# Patient Record
Sex: FEMALE | Race: WHITE | Employment: FULL TIME | URBAN - METROPOLITAN AREA
[De-identification: names, ages, dates, MRNs, and addresses within clinical notes are randomized per-mention and may not be internally consistent; named-entity substitution may affect disease eponyms.]

---

## 2019-04-24 PROBLEM — Z34.90 PREGNANCY: Status: ACTIVE | Noted: 2019-04-24

## 2020-07-21 ENCOUNTER — VIRTUAL VISIT (OUTPATIENT)
Dept: FAMILY MEDICINE CLINIC | Age: 35
End: 2020-07-21

## 2020-07-21 DIAGNOSIS — L65.9 ALOPECIA: ICD-10-CM

## 2020-07-21 DIAGNOSIS — F41.9 ANXIETY: Primary | ICD-10-CM

## 2020-07-21 RX ORDER — SPIRONOLACTONE 100 MG/1
100 TABLET, FILM COATED ORAL DAILY
Qty: 30 TAB | Refills: 0
Start: 2020-07-21 | End: 2020-09-22 | Stop reason: SDUPTHER

## 2020-07-21 NOTE — PROGRESS NOTES
Ami Jarrell is a 37 y.o. female who was seen by synchronous (real-time) audio-video technology on 7/21/2020 for Anxiety (establish care. no fever or chills. no cough.) and Hair/Scalp Problem        Assessment & Plan:   Diagnoses and all orders for this visit:    1. Anxiety    2. Alopecia  -     spironolactone (ALDACTONE) 100 mg tablet; Take 1 Tab by mouth daily. She will follow up with derm and Gyn    I have discussed the diagnosis with the patient and the intended plan of care as seen in the above orders. The patient has received an after-visit summary and questions were answered concerning future plans. I have discussed medication, side effects, and warnings with the patient in detail. The patient verbalized understanding and is in agreement with the plan of care. The patient will contact the office with any additional concerns. 712  Subjective:       Prior to Admission medications    Medication Sig Start Date End Date Taking? Authorizing Provider   spironolactone (ALDACTONE) 100 mg tablet Take 1 Tab by mouth daily. 7/21/20  Yes Shaun Dancer, MD   ibuprofen (MOTRIN) 600 mg tablet Take 1 Tab by mouth every six (6) hours as needed for Pain. 4/27/19   Kathleen-Ezieme, Doreen Shone, MD   PNV/iron/omega3/folic ac/f.a.1 (PRE-PEPE MULTIVITAMINS/MINERALS PO) Take  by mouth. Other, MD Lynda     Patient Active Problem List   Diagnosis Code    Acne L70.9    Rosacea L71.9    Normal labor and delivery O80    Pregnancy Z34.90     Past Medical History:   Diagnosis Date    Abnormal Papanicolaou smear of cervix     Acne 3/25/2011    History of HPV infection     HPV in female     Psychiatric disorder     DEPRESSION    Rosacea 3/25/2011    Scoliosis        Review of Systems   Constitutional: Negative for chills and fever. Respiratory: Negative for cough and shortness of breath. Cardiovascular: Negative for chest pain and palpitations. Skin: Negative for rash. Neurological: Negative. Psychiatric/Behavioral: Negative. Negative for depression, hallucinations, memory loss, substance abuse and suicidal ideas. The patient is not nervous/anxious and does not have insomnia. Objective:   No flowsheet data found. General: alert, cooperative, no distress   Mental  status: normal mood, behavior, speech, dress, motor activity, and thought processes, able to follow commands   HENT: NCAT   Neck: no visualized mass   Resp: no respiratory distress   Neuro: no gross deficits   Skin: no discoloration or lesions of concern on visible areas   Psychiatric: normal affect, consistent with stated mood, no evidence of hallucinations     Additional exam findings: We discussed the expected course, resolution and complications of the diagnosis(es) in detail. Medication risks, benefits, costs, interactions, and alternatives were discussed as indicated. I advised her to contact the office if her condition worsens, changes or fails to improve as anticipated. She expressed understanding with the diagnosis(es) and plan. Allan Chavis, who was evaluated through a patient-initiated, synchronous (real-time) audio-video encounter, and/or her healthcare decision maker, is aware that it is a billable service, with coverage as determined by her insurance carrier. She provided verbal consent to proceed: Yes, and patient identification was verified. It was conducted pursuant to the emergency declaration under the [address] [place] [address] authority and the [employer] General Act. A caregiver was present when appropriate. Ability to conduct physical exam was limited. I was at home. The patient was at home.       Jose Manuel Panchal MD

## 2020-07-28 ENCOUNTER — VIRTUAL VISIT (OUTPATIENT)
Dept: FAMILY MEDICINE CLINIC | Age: 35
End: 2020-07-28

## 2020-07-28 DIAGNOSIS — F41.9 ANXIETY: Primary | ICD-10-CM

## 2020-07-28 RX ORDER — BUSPIRONE HYDROCHLORIDE 7.5 MG/1
7.5 TABLET ORAL 3 TIMES DAILY
Qty: 60 TAB | Refills: 1 | Status: SHIPPED | OUTPATIENT
Start: 2020-07-28

## 2020-07-28 NOTE — PROGRESS NOTES
Braeden De Jesus is a 37 y.o. female who was seen by synchronous (real-time) audio-video technology on 7/28/2020 for Anxiety (she has no ideas of suicide or homicide.  )        Assessment & Plan:   Diagnoses and all orders for this visit:    1. Anxiety  -     busPIRone (BUSPAR) 7.5 mg tablet; Take 1 Tab by mouth three (3) times daily. 712  Subjective:       Prior to Admission medications    Medication Sig Start Date End Date Taking? Authorizing Provider   spironolactone (ALDACTONE) 100 mg tablet Take 1 Tab by mouth daily. 7/21/20   Chaparro Bedoya MD   ibuprofen (MOTRIN) 600 mg tablet Take 1 Tab by mouth every six (6) hours as needed for Pain. 4/27/19   Linda Meyer MD   PNV/iron/omega3/folic ac/f.a.1 (PRE-PEPE MULTIVITAMINS/MINERALS PO) Take  by mouth. Other, MD Lynda     Patient Active Problem List   Diagnosis Code    Acne L70.9    Rosacea L71.9    Normal labor and delivery O80    Pregnancy Z34.90     Past Medical History:   Diagnosis Date    Abnormal Papanicolaou smear of cervix     Acne 3/25/2011    History of HPV infection     HPV in female     Psychiatric disorder     DEPRESSION    Rosacea 3/25/2011    Scoliosis        Review of Systems   Constitutional: Negative for chills and fever. Respiratory: Negative for cough and shortness of breath. Cardiovascular: Negative for chest pain and palpitations. Musculoskeletal: Negative. Psychiatric/Behavioral: Negative for depression, hallucinations, memory loss, substance abuse and suicidal ideas. The patient is nervous/anxious and has insomnia.         Objective:     Patient-Reported Vitals 7/28/2020   Patient-Reported Weight 130   Patient-Reported Height 5'2   Patient-Reported Pulse 98   Patient-Reported Temperature 98.4   Patient-Reported SpO2 99   Patient-Reported LMP July 11      General: alert, cooperative, no distress   Mental  status: normal mood, behavior, speech, dress, motor activity, and thought processes, able to follow commands   HENT: NCAT   Neck: no visualized mass   Resp: no respiratory distress   Neuro: no gross deficits   Skin: no discoloration or lesions of concern on visible areas   Psychiatric: normal affect, consistent with stated mood, no evidence of hallucinations     Additional exam findings: We discussed the expected course, resolution and complications of the diagnosis(es) in detail. Medication risks, benefits, costs, interactions, and alternatives were discussed as indicated. I advised her to contact the office if her condition worsens, changes or fails to improve as anticipated. She expressed understanding with the diagnosis(es) and plan. Dai Ramos, who was evaluated through a patient-initiated, synchronous (real-time) audio-video encounter, and/or her healthcare decision maker, is aware that it is a billable service, with coverage as determined by her insurance carrier. She provided verbal consent to proceed: Yes, and patient identification was verified. It was conducted pursuant to the emergency declaration under the [address] [place] [address] authority and the [employer] and [employer] General Act. A caregiver was present when appropriate. Ability to conduct physical exam was limited. I was in the office. The patient was at home.       Philippe Calvin MD

## 2020-08-06 ENCOUNTER — VIRTUAL VISIT (OUTPATIENT)
Dept: FAMILY MEDICINE CLINIC | Age: 35
End: 2020-08-06

## 2020-08-06 DIAGNOSIS — F41.9 ANXIETY: Primary | ICD-10-CM

## 2020-08-06 NOTE — PROGRESS NOTES
Isabel Pa is a 37 y.o. female who was seen by synchronous (real-time) audio-video technology on 8/6/2020 for Anxiety (no problems)        Assessment & Plan:   Diagnoses and all orders for this visit:    1. Anxiety      Will continue current meds and follow up in about one month in office. 712  Subjective:       Prior to Admission medications    Medication Sig Start Date End Date Taking? Authorizing Provider   busPIRone (BUSPAR) 7.5 mg tablet Take 1 Tab by mouth three (3) times daily. 7/28/20   Ysabel Waters MD   spironolactone (ALDACTONE) 100 mg tablet Take 1 Tab by mouth daily. 7/21/20   Ysabel Waters MD   ibuprofen (MOTRIN) 600 mg tablet Take 1 Tab by mouth every six (6) hours as needed for Pain. 4/27/19   Payam Meyer MD   PNV/iron/omega3/folic ac/f.a.1 (PRE-PEPE MULTIVITAMINS/MINERALS PO) Take  by mouth. Other, MD Lynda     Patient Active Problem List   Diagnosis Code    Acne L70.9    Rosacea L71.9    Normal labor and delivery O80    Pregnancy Z34.90     Past Medical History:   Diagnosis Date    Abnormal Papanicolaou smear of cervix     Acne 3/25/2011    History of HPV infection     HPV in female     Psychiatric disorder     DEPRESSION    Rosacea 3/25/2011    Scoliosis        Review of Systems   Constitutional: Negative for chills and fever. Respiratory: Negative for cough. Cardiovascular: Negative for chest pain and palpitations. Psychiatric/Behavioral: Negative for depression, hallucinations, substance abuse and suicidal ideas. The patient is not nervous/anxious and does not have insomnia.         Objective:     Patient-Reported Vitals 7/28/2020   Patient-Reported Weight 130   Patient-Reported Height 5'2   Patient-Reported Pulse 98   Patient-Reported Temperature 98.4   Patient-Reported SpO2 99   Patient-Reported LMP July 11      General: alert, cooperative, no distress   Mental  status: normal mood, behavior, speech, dress, motor activity, and thought processes, able to follow commands   HENT: NCAT   Neck: no visualized mass   Resp: no respiratory distress   Neuro: no gross deficits   Skin: no discoloration or lesions of concern on visible areas   Psychiatric: normal affect, consistent with stated mood, no evidence of hallucinations     Additional exam findings: We discussed the expected course, resolution and complications of the diagnosis(es) in detail. Medication risks, benefits, costs, interactions, and alternatives were discussed as indicated. I advised her to contact the office if her condition worsens, changes or fails to improve as anticipated. She expressed understanding with the diagnosis(es) and plan. Radha Hallman, who was evaluated through a patient-initiated, synchronous (real-time) audio-video encounter, and/or her healthcare decision maker, is aware that it is a billable service, with coverage as determined by her insurance carrier. She provided verbal consent to proceed: Yes, and patient identification was verified. It was conducted pursuant to the emergency declaration under the [address] [place] [address] authority and the [employer] and [employer] General Act. A caregiver was present when appropriate. Ability to conduct physical exam was limited. I was at home. The patient was at home.       Jose Cooley MD

## 2020-09-22 ENCOUNTER — TELEPHONE (OUTPATIENT)
Dept: FAMILY MEDICINE CLINIC | Age: 35
End: 2020-09-22

## 2020-09-22 ENCOUNTER — OFFICE VISIT (OUTPATIENT)
Dept: FAMILY MEDICINE CLINIC | Age: 35
End: 2020-09-22
Payer: COMMERCIAL

## 2020-09-22 VITALS
HEART RATE: 71 BPM | OXYGEN SATURATION: 97 % | SYSTOLIC BLOOD PRESSURE: 122 MMHG | DIASTOLIC BLOOD PRESSURE: 60 MMHG | RESPIRATION RATE: 14 BRPM | TEMPERATURE: 98.2 F

## 2020-09-22 DIAGNOSIS — R10.30 LOWER ABDOMINAL PAIN: ICD-10-CM

## 2020-09-22 DIAGNOSIS — L65.9 ALOPECIA: Primary | ICD-10-CM

## 2020-09-22 DIAGNOSIS — E55.9 VITAMIN D DEFICIENCY: ICD-10-CM

## 2020-09-22 PROCEDURE — 99214 OFFICE O/P EST MOD 30 MIN: CPT | Performed by: FAMILY MEDICINE

## 2020-09-22 RX ORDER — VALACYCLOVIR HYDROCHLORIDE 1 G/1
1000 TABLET, FILM COATED ORAL 2 TIMES DAILY
Qty: 20 TAB | Refills: 0 | Status: SHIPPED | OUTPATIENT
Start: 2020-09-22 | End: 2020-10-02

## 2020-09-22 RX ORDER — SPIRONOLACTONE 100 MG/1
100 TABLET, FILM COATED ORAL DAILY
Qty: 30 TAB | Refills: 0 | Status: SHIPPED | OUTPATIENT
Start: 2020-09-22 | End: 2020-10-15 | Stop reason: SDUPTHER

## 2020-09-22 NOTE — TELEPHONE ENCOUNTER
Have you been diagnosed with, tested for, or told that you are suspected of having COVID-19 (coronovirus)? No  Have you had a fever or taken medication to treat a fever in the past 72 hours? No  Have you had a cough, SOB, or flu-like symptoms within the past 3 days? No  Have you had direct contact with someone who tested positive for COVID-19 within the past 14 days? No  Do you have a household member with flu-like symptoms, including fever, cough, or SOB? No  Do you currently have flu-like symptoms including fever, cough, or SOB? No  Are you experiencing new loss of taste or smell?  No

## 2020-09-22 NOTE — PATIENT INSTRUCTIONS
Well Visit, Ages 25 to 48: Care Instructions Your Care Instructions Physical exams can help you stay healthy. Your doctor has checked your overall health and may have suggested ways to take good care of yourself. He or she also may have recommended tests. At home, you can help prevent illness with healthy eating, regular exercise, and other steps. Follow-up care is a key part of your treatment and safety. Be sure to make and go to all appointments, and call your doctor if you are having problems. It's also a good idea to know your test results and keep a list of the medicines you take. How can you care for yourself at home? · Reach and stay at a healthy weight. This will lower your risk for many problems, such as obesity, diabetes, heart disease, and high blood pressure. · Get at least 30 minutes of physical activity on most days of the week. Walking is a good choice. You also may want to do other activities, such as running, swimming, cycling, or playing tennis or team sports. Discuss any changes in your exercise program with your doctor. · Do not smoke or allow others to smoke around you. If you need help quitting, talk to your doctor about stop-smoking programs and medicines. These can increase your chances of quitting for good. · Talk to your doctor about whether you have any risk factors for sexually transmitted infections (STIs). Having one sex partner (who does not have STIs and does not have sex with anyone else) is a good way to avoid these infections. · Use birth control if you do not want to have children at this time. Talk with your doctor about the choices available and what might be best for you. · Protect your skin from too much sun. When you're outdoors from 10 a.m. to 4 p.m., stay in the shade or cover up with clothing and a hat with a wide brim. Wear sunglasses that block UV rays. Even when it's cloudy, put broad-spectrum sunscreen (SPF 30 or higher) on any exposed skin. · See a dentist one or two times a year for checkups and to have your teeth cleaned. · Wear a seat belt in the car. Follow your doctor's advice about when to have certain tests. These tests can spot problems early. For everyone · Cholesterol. Have the fat (cholesterol) in your blood tested after age 21. Your doctor will tell you how often to have this done based on your age, family history, or other things that can increase your risk for heart disease. · Blood pressure. Have your blood pressure checked during a routine doctor visit. Your doctor will tell you how often to check your blood pressure based on your age, your blood pressure results, and other factors. · Vision. Talk with your doctor about how often to have a glaucoma test. 
· Diabetes. Ask your doctor whether you should have tests for diabetes. · Colon cancer. Your risk for colorectal cancer gets higher as you get older. Some experts say that adults should start regular screening at age 48 and stop at age 76. Others say to start before age 48 or continue after age 76. Talk with your doctor about your risk and when to start and stop screening. For women · Breast exam and mammogram. Talk to your doctor about when you should have a clinical breast exam and a mammogram. Medical experts differ on whether and how often women under 50 should have these tests. Your doctor can help you decide what is right for you. · Cervical cancer screening test and pelvic exam. Begin with a Pap test at age 24. The test often is part of a pelvic exam. Starting at age 27, you may choose to have a Pap test, an HPV test, or both tests at the same time (called co-testing). Talk with your doctor about how often to have testing. · Tests for sexually transmitted infections (STIs). Ask whether you should have tests for STIs. You may be at risk if you have sex with more than one person, especially if your partners do not wear condoms. For men · Tests for sexually transmitted infections (STIs). Ask whether you should have tests for STIs. You may be at risk if you have sex with more than one person, especially if you do not wear a condom. · Testicular cancer exam. Ask your doctor whether you should check your testicles regularly. · Prostate exam. Talk to your doctor about whether you should have a blood test (called a PSA test) for prostate cancer. Experts differ on whether and when men should have this test. Some experts suggest it if you are older than 39 and are -American or have a father or brother who got prostate cancer when he was younger than 72. When should you call for help? Watch closely for changes in your health, and be sure to contact your doctor if you have any problems or symptoms that concern you. Where can you learn more? Go to http://lisa-mandie.info/ Enter P072 in the search box to learn more about \"Well Visit, Ages 25 to 48: Care Instructions. \" Current as of: May 27, 2020               Content Version: 12.6 © 2269-2910 [employer]. Care instructions adapted under license by [employer] (which disclaims liability or warranty for this information). If you have questions about a medical condition or this instruction, always ask your healthcare professional. [address] any warranty or liability for your use of this information.

## 2020-09-22 NOTE — PROGRESS NOTES
Pt concerned about hair loss and night sweats. She feels that nobody is listening to her and that she keeps getting sent to various specialists. She wants a doctor who is going to listen and help her. 1. Have you been to the ER, urgent care clinic since your last visit? Hospitalized since your last visit? No    2. Have you seen or consulted any other health care providers outside of the [address] [place] [address] since your last visit? Include any pap smears or colon screening.  No

## 2020-09-22 NOTE — PROGRESS NOTES
Michelle Rodriguez is a 37 y.o. female  presents for CPE. She has intermittent lower abdo/pelvic pain. No dysuria or vaginal discharge. She has seen gyn for pain. No Known Allergies  Outpatient Medications Marked as Taking for the 9/22/20 encounter (Office Visit) with Vernell Gonsalves MD   Medication Sig Dispense Refill    spironolactone (ALDACTONE) 100 mg tablet Take 1 Tab by mouth daily. 30 Tab 0    valACYclovir (VALTREX) 1 gram tablet Take 1 Tab by mouth two (2) times a day for 10 days. 20 Tab 0    busPIRone (BUSPAR) 7.5 mg tablet Take 1 Tab by mouth three (3) times daily. 60 Tab 1    ibuprofen (MOTRIN) 600 mg tablet Take 1 Tab by mouth every six (6) hours as needed for Pain. 30 Tab 1    PNV/iron/omega3/folic ac/f.a.1 (PRE-PEPE MULTIVITAMINS/MINERALS PO) Take  by mouth.        Patient Active Problem List   Diagnosis Code    Acne L70.9    Rosacea L71.9    Normal labor and delivery O80    Pregnancy Z34.90     Past Medical History:   Diagnosis Date    Abnormal Papanicolaou smear of cervix     Acne 3/25/2011    History of HPV infection     HPV in female     Psychiatric disorder     DEPRESSION    Rosacea 3/25/2011    Scoliosis      Social History     Socioeconomic History    Marital status: SINGLE     Spouse name: Not on file    Number of children: 2    Years of education: Not on file    Highest education level: Some college, no degree   Tobacco Use    Smoking status: Never Smoker    Smokeless tobacco: Never Used   Substance and Sexual Activity    Alcohol use: No    Drug use: No    Sexual activity: Yes   Other Topics Concern     Family History   Problem Relation Age of Onset    Heart Disease Sister     Pacemaker Sister     Other Sister     Heart Disease Maternal Grandfather     Heart Attack Maternal Grandfather     No Known Problems Mother     No Known Problems Brother     No Known Problems Maternal Grandmother         Review of Systems   Constitutional: Negative for chills, fever, malaise/fatigue and weight loss. Eyes: Negative for blurred vision. Respiratory: Negative for shortness of breath and wheezing. Cardiovascular: Negative for chest pain. Gastrointestinal: Positive for abdominal pain. Negative for nausea and vomiting. Genitourinary: Negative. Musculoskeletal: Negative for myalgias. Skin: Negative for rash. Neurological: Negative for weakness. Vitals:    09/22/20 1059   BP: 122/60   Pulse: 71   Resp: 14   Temp: 98.2 °F (36.8 °C)   SpO2: 97%   PainSc:   0 - No pain   LMP: 08/20/2020       Physical Exam  Vitals signs and nursing note reviewed. Constitutional:       Appearance: Normal appearance. HENT:      Nose: Nose normal.      Mouth/Throat:      Mouth: Mucous membranes are moist.   Eyes:      Extraocular Movements: Extraocular movements intact. Conjunctiva/sclera: Conjunctivae normal.      Pupils: Pupils are equal, round, and reactive to light. Neck:      Musculoskeletal: Normal range of motion and neck supple. Cardiovascular:      Rate and Rhythm: Normal rate and regular rhythm. Pulses: Normal pulses. Heart sounds: Normal heart sounds. Pulmonary:      Effort: Pulmonary effort is normal.      Breath sounds: Normal breath sounds. Abdominal:      General: Abdomen is flat. Bowel sounds are normal.      Palpations: Abdomen is soft. There is no mass. Tenderness: There is no abdominal tenderness. Musculoskeletal: Normal range of motion. Skin:     General: Skin is warm and dry. Neurological:      General: No focal deficit present. Mental Status: She is alert and oriented to person, place, and time. Psychiatric:         Mood and Affect: Mood and affect normal.         Behavior: Behavior normal.         Thought Content: Thought content normal.         Cognition and Memory: Memory normal.         Judgment: Judgment normal.         Assessment/Plan      ICD-10-CM ICD-9-CM    1.  Alopecia  L65.9 704.00 spironolactone (ALDACTONE) 100 mg tablet      valACYclovir (VALTREX) 1 gram tablet      TSH AND FREE T4      TSH AND FREE T4   2. Vitamin D deficiency  E55.9 268.9 VITAMIN D, 25 HYDROXY      VITAMIN D, 25 HYDROXY   3. Lower abdominal pain  R10.30 789.09 CBC WITH AUTOMATED DIFF      METABOLIC PANEL, COMPREHENSIVE      METABOLIC PANEL, COMPREHENSIVE      CBC WITH AUTOMATED DIFF     I have discussed the diagnosis with the patient and the intended plan of care as seen in the above orders. The patient has received an after-visit summary and questions were answered concerning future plans. I have discussed medication, side effects, and warnings with the patient in detail. The patient verbalized understanding and is in agreement with the plan of care. The patient will contact the office with any additional concerns.       Follow-up and Dispositions    · Return if symptoms worsen or fail to improve.       lab results and schedule of future lab studies reviewed with patient    Maria Guadalupe Jones MD

## 2020-09-23 LAB
25(OH)D3 SERPL-MCNC: 60.6 NG/ML (ref 32–100)
A-G RATIO,AGRAT: 2.5 RATIO (ref 1.1–2.6)
ABSOLUTE LYMPHOCYTE COUNT, 10803: 2.6 K/UL (ref 1–4.8)
ALBUMIN SERPL-MCNC: 4.9 G/DL (ref 3.5–5)
ALP SERPL-CCNC: 45 U/L (ref 25–115)
ALT SERPL-CCNC: 15 U/L (ref 5–40)
ANION GAP SERPL CALC-SCNC: 12 MMOL/L (ref 3–15)
AST SERPL W P-5'-P-CCNC: 18 U/L (ref 10–37)
BASOPHILS # BLD: 0.1 K/UL (ref 0–0.2)
BASOPHILS NFR BLD: 1 % (ref 0–2)
BILIRUB SERPL-MCNC: 0.4 MG/DL (ref 0.2–1.2)
BUN SERPL-MCNC: 14 MG/DL (ref 6–22)
CALCIUM SERPL-MCNC: 9.5 MG/DL (ref 8.4–10.5)
CHLORIDE SERPL-SCNC: 102 MMOL/L (ref 98–110)
CO2 SERPL-SCNC: 28 MMOL/L (ref 20–32)
CREAT SERPL-MCNC: 0.6 MG/DL (ref 0.5–1.2)
EOSINOPHIL # BLD: 0 K/UL (ref 0–0.5)
EOSINOPHIL NFR BLD: 0 % (ref 0–6)
ERYTHROCYTE [DISTWIDTH] IN BLOOD BY AUTOMATED COUNT: 11.5 % (ref 10–15.5)
GFRAA, 66117: >60
GFRNA, 66118: >60
GLOBULIN,GLOB: 2 G/DL (ref 2–4)
GLUCOSE SERPL-MCNC: 99 MG/DL (ref 70–99)
GRANULOCYTES,GRANS: 65 % (ref 40–75)
HCT VFR BLD AUTO: 43 % (ref 35.1–46.5)
HGB BLD-MCNC: 13.7 G/DL (ref 11.7–15.5)
LYMPHOCYTES, LYMLT: 26 % (ref 20–45)
MCH RBC QN AUTO: 31 PG (ref 26–34)
MCHC RBC AUTO-ENTMCNC: 32 G/DL (ref 31–36)
MCV RBC AUTO: 99 FL (ref 81–99)
MONOCYTES # BLD: 0.8 K/UL (ref 0.1–1)
MONOCYTES NFR BLD: 8 % (ref 3–12)
NEUTROPHILS # BLD AUTO: 6.6 K/UL (ref 1.8–7.7)
PLATELET # BLD AUTO: 315 K/UL (ref 140–440)
PMV BLD AUTO: 11.2 FL (ref 9–13)
POTASSIUM SERPL-SCNC: 4.4 MMOL/L (ref 3.5–5.5)
PROT SERPL-MCNC: 6.9 G/DL (ref 6.4–8.3)
RBC # BLD AUTO: 4.36 M/UL (ref 3.8–5.2)
SODIUM SERPL-SCNC: 142 MMOL/L (ref 133–145)
T4 FREE SERPL-MCNC: 1.5 NG/DL (ref 0.9–1.8)
TSH SERPL DL<=0.005 MIU/L-ACNC: 2.04 MCU/ML (ref 0.27–4.2)
WBC # BLD AUTO: 10.1 K/UL (ref 4–11)

## 2020-09-28 ENCOUNTER — TELEPHONE (OUTPATIENT)
Dept: FAMILY MEDICINE CLINIC | Age: 35
End: 2020-09-28

## 2020-09-28 NOTE — TELEPHONE ENCOUNTER
Patient called the office stating she does not think she and Dr. Mikayla Arora are a good fit. She says she has had 3 visits with him 2 virtual and one in office. She has asked if she can switch her care to Dr. Simran Kwan, patient has been made aware Dr. Simran Kwan is not accepting new patients at this time. She is asking when will Dr. Simran Kwan be accepting new patients again she says she would be more comfortable with a female doctor anyway so she could tell them everything. Told I was not sure when Dr. Simran Kwan will be accepting new patients again she has asked if she can be notified. Told patient I can send a message to our  who may have more information on this.

## 2020-10-01 NOTE — TELEPHONE ENCOUNTER
Patient called in reference to her lab results. She states she has a lab work question. Her thyroid levels have increased and wants to know if Dr. Elia Womack will recheck it soon, as she seems to notice changes recently.

## 2020-10-06 ENCOUNTER — PATIENT MESSAGE (OUTPATIENT)
Dept: FAMILY MEDICINE CLINIC | Age: 35
End: 2020-10-06

## 2020-10-15 DIAGNOSIS — L65.9 ALOPECIA: ICD-10-CM

## 2020-10-15 RX ORDER — SPIRONOLACTONE 100 MG/1
TABLET, FILM COATED ORAL
Qty: 30 TAB | Refills: 0 | Status: SHIPPED | OUTPATIENT
Start: 2020-10-15 | End: 2020-12-23

## 2020-10-20 ENCOUNTER — TELEPHONE (OUTPATIENT)
Dept: FAMILY MEDICINE CLINIC | Age: 35
End: 2020-10-20

## 2020-10-20 ENCOUNTER — HOSPITAL ENCOUNTER (OUTPATIENT)
Dept: LAB | Age: 35
Discharge: HOME | End: 2020-10-20
Payer: COMMERCIAL

## 2020-10-20 ENCOUNTER — CLINICAL SUPPORT (OUTPATIENT)
Dept: FAMILY MEDICINE CLINIC | Age: 35
End: 2020-10-20
Payer: COMMERCIAL

## 2020-10-20 DIAGNOSIS — R59.9 SWOLLEN LYMPH NODES: ICD-10-CM

## 2020-10-20 DIAGNOSIS — M25.50 ARTHRALGIA, UNSPECIFIED JOINT: ICD-10-CM

## 2020-10-20 DIAGNOSIS — R53.83 FATIGUE, UNSPECIFIED TYPE: ICD-10-CM

## 2020-10-20 DIAGNOSIS — L65.9 ALOPECIA: ICD-10-CM

## 2020-10-20 DIAGNOSIS — L65.9 ALOPECIA: Primary | ICD-10-CM

## 2020-10-20 LAB
T4 FREE SERPL-MCNC: 1.1 NG/DL (ref 0.7–1.5)
TSH SERPL DL<=0.05 MIU/L-ACNC: 2.35 UIU/ML (ref 0.36–3.74)

## 2020-10-20 PROCEDURE — 36415 COLL VENOUS BLD VENIPUNCTURE: CPT

## 2020-10-20 PROCEDURE — 36415 COLL VENOUS BLD VENIPUNCTURE: CPT | Performed by: FAMILY MEDICINE

## 2020-10-20 PROCEDURE — 84439 ASSAY OF FREE THYROXINE: CPT

## 2020-10-20 PROCEDURE — 86617 LYME DISEASE ANTIBODY: CPT

## 2020-10-20 PROCEDURE — 86225 DNA ANTIBODY NATIVE: CPT

## 2020-10-20 NOTE — PROGRESS NOTES
Elder Desai 1985 came in to have blood drawn. Name/ verified. Venipuncture performed on left arm. Pt tolerated it well.      Leatha Ambrose LPN

## 2020-10-20 NOTE — PROGRESS NOTES
Pt also had c/o neck tenderness and swollen lymphnodes. After consulting dr Leonel Sher, ultrasound ordered for pt. Lyme test also added per dr Leonel Sher.

## 2020-10-22 LAB
CENTROMERE B AB SER-ACNC: <0.2 AI (ref 0–0.9)
CHROMATIN AB SERPL-ACNC: <0.2 AI (ref 0–0.9)
DSDNA AB SER-ACNC: 1 IU/ML (ref 0–9)
ENA JO1 AB SER-ACNC: <0.2 AI (ref 0–0.9)
ENA RNP AB SER-ACNC: 0.8 AI (ref 0–0.9)
ENA SCL70 AB SER-ACNC: 0.3 AI (ref 0–0.9)
ENA SM AB SER-ACNC: <0.2 AI (ref 0–0.9)
ENA SS-A AB SER-ACNC: <0.2 AI (ref 0–0.9)
ENA SS-B AB SER-ACNC: <0.2 AI (ref 0–0.9)
SEE BELOW, 164869: NORMAL

## 2020-10-23 ENCOUNTER — HOSPITAL ENCOUNTER (OUTPATIENT)
Dept: ULTRASOUND IMAGING | Age: 35
Discharge: HOME | End: 2020-10-23
Attending: FAMILY MEDICINE
Payer: COMMERCIAL

## 2020-10-23 ENCOUNTER — TELEPHONE (OUTPATIENT)
Dept: FAMILY MEDICINE CLINIC | Age: 35
End: 2020-10-23

## 2020-10-23 DIAGNOSIS — R59.9 SWOLLEN LYMPH NODES: ICD-10-CM

## 2020-10-23 DIAGNOSIS — E04.9 ENLARGED THYROID: Primary | ICD-10-CM

## 2020-10-23 DIAGNOSIS — R53.83 FATIGUE, UNSPECIFIED TYPE: ICD-10-CM

## 2020-10-23 PROCEDURE — 76536 US EXAM OF HEAD AND NECK: CPT

## 2020-10-23 NOTE — TELEPHONE ENCOUNTER
Additional labs have resulted and patient has seen them through [facility] but would like to know from Dr. Savanna Zhang the results. She is having an ultrasound of her thyroid today at 1:00 so may not be available at that time but is requesting results today. She can  Be reached at [phone].

## 2020-10-26 ENCOUNTER — TELEPHONE (OUTPATIENT)
Dept: FAMILY MEDICINE CLINIC | Age: 35
End: 2020-10-26

## 2020-10-26 NOTE — TELEPHONE ENCOUNTER
Referral, ov notes, insurance, and demos faxed to [facility]. It was faxed Friday, but [facility] did not receive it. Faxed again today. Fax confirmation received.

## 2020-10-26 NOTE — TELEPHONE ENCOUNTER
Ms. Malik Dickinson called concerned about her referral to [facility]. She tried to make an appointment with them, however they stated they never received records from us. Carrie Miller did in fact fax the records, but she printed them and re sent this morning. Transmission \"Ok\" Slip received, called patient and informed her that fax was sent again to [phone]. She is calling them to schedule will call us back if she has any problems.

## 2020-10-29 LAB
B BURGDOR IGG PATRN SER IB-IMP: NEGATIVE
B BURGDOR IGM PATRN SER IB-IMP: NEGATIVE
B BURGDOR18KD IGG SER QL IB: ABNORMAL
B BURGDOR23KD IGG SER QL IB: ABNORMAL
B BURGDOR23KD IGM SER QL IB: PRESENT
B BURGDOR28KD IGG SER QL IB: ABNORMAL
B BURGDOR30KD IGG SER QL IB: ABNORMAL
B BURGDOR39KD IGG SER QL IB: ABNORMAL
B BURGDOR39KD IGM SER QL IB: ABNORMAL
B BURGDOR41KD IGG SER QL IB: ABNORMAL
B BURGDOR41KD IGM SER QL IB: ABNORMAL
B BURGDOR45KD IGG SER QL IB: ABNORMAL
B BURGDOR58KD IGG SER QL IB: ABNORMAL
B BURGDOR66KD IGG SER QL IB: ABNORMAL
B BURGDOR93KD IGG SER QL IB: ABNORMAL

## 2020-11-09 DIAGNOSIS — L65.9 ALOPECIA: Primary | ICD-10-CM

## 2020-11-10 ENCOUNTER — TELEPHONE (OUTPATIENT)
Dept: FAMILY MEDICINE CLINIC | Age: 35
End: 2020-11-10

## 2020-11-10 NOTE — TELEPHONE ENCOUNTER
Called to let pt know that I have faxed over second opinion referral, ov notes, and imagine to Dr. Kehinde Shearer office at     [facility]. [address] [place] [address], [address],[facility] [address] [phone]       Pt's daughter answer the phone saying that Mrs. Gala Salter just went into the store. I asked her to let her mom know to contact the office when she gets the chance. She said that she would. Call was ended.

## 2020-11-10 NOTE — TELEPHONE ENCOUNTER
Ms. Malik Dickinson called early afternoon asking to speak with the nurse. She was inquiring about a status on a new referral to Endocrinology. The referral was placed by Dr. Malik Dickinson yesterday for a second opinion. Ms. Malik Dickinson was concerned as to why she hasn't heard anything yet. I explained to her that a referral takes 7-14 business days to process. 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 She expresses to me that she is very concerned about her health & no one is listening to her or returning her calls. Dr. Malik Dickinson contacted the patient and told her he is putting in a new referral for another endocrinology for a 2nd opinion. She told me she recently called her ob/gyn and they had her in, in two days. They discovered her lymph nodes are swollen & they placed a referral to a surgeon to do a consult. She states that she is frustrated because she always has to call [facility] multiple times because of lack of responses. She expresses she has had to call here for her lab results because no one will call about her results. I apologized to her and informed her that the I fwd the concerns to the nurse and manager.,  I explained the nurse forwards msgs placed by the front end staff to the provider & once he responds the nurse contacts her with the information. She explained that she doesn't understand why it takes so long & why she cannot get calls back in a timely manner. She stated at this point she is considering finding a new pcp and contacting [employer] to complain about not getting calls. She states if something happens to her because of lack in care, somebody will be responsible.  I apologized again & told her I would forward her concerns & ask the nurse to contact her in reference to the new referral.

## 2020-11-11 NOTE — TELEPHONE ENCOUNTER
Pt returned my phone call yesterday. I let her know that I had faxed over her referral and info to [facility]. I also gave her the phone number so that she could follow up with them. She told me that she is now having 'new sxs'. Her eyes are puffy and her face, mainly her left cheek is swollen. She also stated that she has an appt on Monday with a general surgeon at [facility] on Monday for her swollen lymph nodes. Pt said she would be calling [facility] sometime this week. There were no further questions or concerns at this time. Call was ended.

## 2020-12-23 DIAGNOSIS — L65.9 ALOPECIA: ICD-10-CM

## 2020-12-23 RX ORDER — SPIRONOLACTONE 100 MG/1
TABLET, FILM COATED ORAL
Qty: 30 TAB | Refills: 0 | Status: SHIPPED | OUTPATIENT
Start: 2020-12-23 | End: 2021-01-22

## 2021-01-22 DIAGNOSIS — L65.9 ALOPECIA: ICD-10-CM

## 2021-01-22 RX ORDER — SPIRONOLACTONE 100 MG/1
TABLET, FILM COATED ORAL
Qty: 30 TAB | Refills: 0 | Status: SHIPPED | OUTPATIENT
Start: 2021-01-22 | End: 2021-02-24

## 2021-02-24 DIAGNOSIS — L65.9 ALOPECIA: ICD-10-CM

## 2021-02-24 RX ORDER — SPIRONOLACTONE 100 MG/1
TABLET, FILM COATED ORAL
Qty: 30 TAB | Refills: 0 | Status: SHIPPED | OUTPATIENT
Start: 2021-02-24 | End: 2021-03-12 | Stop reason: SDUPTHER

## 2021-03-12 DIAGNOSIS — L65.9 ALOPECIA: ICD-10-CM

## 2021-03-12 RX ORDER — SPIRONOLACTONE 100 MG/1
TABLET, FILM COATED ORAL
Qty: 30 TAB | Refills: 0 | Status: SHIPPED | OUTPATIENT
Start: 2021-03-12

## 2021-03-12 NOTE — TELEPHONE ENCOUNTER
This pharmacy faxed over request for the following prescriptions to be filled:    Medication requested :  Requested Prescriptions     Pending Prescriptions Disp Refills    spironolactone (ALDACTONE) 100 mg tablet 30 Tab 0         PCP: Dr. Grey Small or Print: [facility]  Mail order or Local pharmacy [phone]    Scheduled appointment if not seen by current providers in office: LOV 09/22/20 No upcoming scheduled.

## 2021-10-19 ENCOUNTER — TRANSCRIBE ORDER (OUTPATIENT)
Dept: SCHEDULING | Age: 36
End: 2021-10-19

## 2021-10-19 DIAGNOSIS — E03.9 HYPOTHYROID: Primary | ICD-10-CM

## 2021-10-21 ENCOUNTER — HOSPITAL ENCOUNTER (OUTPATIENT)
Dept: ULTRASOUND IMAGING | Age: 36
Discharge: HOME | End: 2021-10-21
Attending: INTERNAL MEDICINE
Payer: COMMERCIAL

## 2021-10-21 DIAGNOSIS — E03.9 HYPOTHYROID: ICD-10-CM

## 2021-10-21 PROCEDURE — 76536 US EXAM OF HEAD AND NECK: CPT

## 2022-03-19 PROBLEM — Z34.90 PREGNANCY: Status: ACTIVE | Noted: 2019-04-24

## 2023-02-01 ENCOUNTER — TRANSCRIBE ORDER (OUTPATIENT)
Dept: SCHEDULING | Age: 38
End: 2023-02-01

## 2023-02-01 DIAGNOSIS — N64.4 MASTODYNIA: Primary | ICD-10-CM

## 2023-02-02 ENCOUNTER — TRANSCRIBE ORDERS (OUTPATIENT)
Facility: HOSPITAL | Age: 38
End: 2023-02-02

## 2023-02-02 DIAGNOSIS — N64.4 MASTODYNIA: Primary | ICD-10-CM

## 2023-02-05 DIAGNOSIS — N64.4 MASTODYNIA: Primary | ICD-10-CM

## 2023-02-21 ENCOUNTER — HOSPITAL ENCOUNTER (OUTPATIENT)
Facility: HOSPITAL | Age: 38
Discharge: HOME | End: 2023-02-24
Payer: COMMERCIAL

## 2023-02-21 DIAGNOSIS — N64.4 MASTODYNIA: ICD-10-CM

## 2023-02-21 DIAGNOSIS — N64.4 BREAST PAIN, RIGHT: ICD-10-CM

## 2023-02-21 PROCEDURE — 76642 ULTRASOUND BREAST LIMITED: CPT

## 2023-02-21 PROCEDURE — G0279 TOMOSYNTHESIS, MAMMO: HCPCS

## 2023-10-17 ENCOUNTER — TRANSCRIBE ORDERS (OUTPATIENT)
Facility: HOSPITAL | Age: 38
End: 2023-10-17

## 2023-10-17 DIAGNOSIS — Z12.31 VISIT FOR SCREENING MAMMOGRAM: Primary | ICD-10-CM

## 2024-02-19 ENCOUNTER — HOSPITAL ENCOUNTER (OUTPATIENT)
Facility: HOSPITAL | Age: 39
Discharge: HOME | End: 2024-02-22
Attending: INTERNAL MEDICINE
Payer: COMMERCIAL

## 2024-02-19 DIAGNOSIS — M53.3 SACRAL PAIN: ICD-10-CM

## 2024-02-19 DIAGNOSIS — M54.9 BACK PAIN, UNSPECIFIED BACK LOCATION, UNSPECIFIED BACK PAIN LATERALITY, UNSPECIFIED CHRONICITY: ICD-10-CM

## 2024-02-19 DIAGNOSIS — M54.17 LUMBOSACRAL RADICULOPATHY: ICD-10-CM

## 2024-02-19 PROCEDURE — 72148 MRI LUMBAR SPINE W/O DYE: CPT

## 2024-07-08 ENCOUNTER — OFFICE VISIT (OUTPATIENT)
Age: 39
End: 2024-07-08
Payer: COMMERCIAL

## 2024-07-08 VITALS
HEIGHT: 60 IN | WEIGHT: 126 LBS | BODY MASS INDEX: 24.74 KG/M2 | TEMPERATURE: 98 F | HEART RATE: 75 BPM | OXYGEN SATURATION: 99 %

## 2024-07-08 DIAGNOSIS — M54.50 LUMBAR PAIN: ICD-10-CM

## 2024-07-08 DIAGNOSIS — M54.2 NECK PAIN: Primary | ICD-10-CM

## 2024-07-08 DIAGNOSIS — S16.1XXD STRAIN OF NECK MUSCLE, SUBSEQUENT ENCOUNTER: ICD-10-CM

## 2024-07-08 DIAGNOSIS — D17.1 LIPOMA OF LUMBOSACRAL REGION: ICD-10-CM

## 2024-07-08 DIAGNOSIS — M50.30 DDD (DEGENERATIVE DISC DISEASE), CERVICAL: ICD-10-CM

## 2024-07-08 PROCEDURE — 99204 OFFICE O/P NEW MOD 45 MIN: CPT | Performed by: PHYSICAL MEDICINE & REHABILITATION

## 2024-07-08 PROCEDURE — 72040 X-RAY EXAM NECK SPINE 2-3 VW: CPT | Performed by: PHYSICAL MEDICINE & REHABILITATION

## 2024-07-08 RX ORDER — FLUTICASONE PROPIONATE 50 MCG
1 SPRAY, SUSPENSION (ML) NASAL DAILY
COMMUNITY
Start: 2024-04-03

## 2024-07-08 RX ORDER — IBUPROFEN 800 MG/1
800 TABLET ORAL 3 TIMES DAILY PRN
Qty: 45 TABLET | Refills: 0 | Status: SHIPPED | OUTPATIENT
Start: 2024-07-08

## 2024-07-08 RX ORDER — METHYLPREDNISOLONE 4 MG/1
TABLET ORAL
Qty: 1 KIT | Refills: 0 | Status: SHIPPED | OUTPATIENT
Start: 2024-07-08

## 2024-07-08 RX ORDER — TRETINOIN 0.5 MG/G
CREAM TOPICAL NIGHTLY
COMMUNITY
Start: 2022-11-15

## 2024-07-08 RX ORDER — VALACYCLOVIR HYDROCHLORIDE 1 G/1
1 TABLET, FILM COATED ORAL 2 TIMES DAILY
COMMUNITY

## 2024-07-08 RX ORDER — KETOCONAZOLE 20 MG/ML
SHAMPOO TOPICAL DAILY PRN
COMMUNITY
Start: 2022-12-29

## 2024-07-08 RX ORDER — MINOXIDIL 2.5 MG/1
2.5 TABLET ORAL DAILY
COMMUNITY
Start: 2022-12-29
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
## 2024-08-07 ENCOUNTER — OFFICE VISIT (OUTPATIENT)
Facility: CLINIC | Age: 39
End: 2024-08-07
Payer: COMMERCIAL

## 2024-08-07 DIAGNOSIS — M25.521 RIGHT ELBOW PAIN: ICD-10-CM

## 2024-08-07 DIAGNOSIS — E01.0 THYROMEGALY: ICD-10-CM

## 2024-08-07 DIAGNOSIS — R76.8 ANTI-RNP ANTIBODIES PRESENT: ICD-10-CM

## 2024-08-07 DIAGNOSIS — L65.9 ALOPECIA: ICD-10-CM

## 2024-08-07 DIAGNOSIS — F41.1 GENERALIZED ANXIETY DISORDER: Primary | ICD-10-CM

## 2024-08-07 PROBLEM — M54.50 ACUTE LEFT-SIDED LOW BACK PAIN WITHOUT SCIATICA: Status: ACTIVE | Noted: 2021-03-24

## 2024-08-07 PROBLEM — R53.82 CHRONIC FATIGUE: Status: ACTIVE | Noted: 2020-03-18

## 2024-08-07 PROBLEM — M25.50 POLYARTHRALGIA: Status: ACTIVE | Noted: 2021-07-12

## 2024-08-07 PROBLEM — N64.52 DISCHARGE FROM NIPPLE: Status: ACTIVE | Noted: 2021-07-30

## 2024-08-07 PROBLEM — G90.A POSTURAL ORTHOSTATIC TACHYCARDIA SYNDROME: Status: ACTIVE | Noted: 2024-08-07

## 2024-08-07 PROBLEM — M77.11 LATERAL EPICONDYLITIS, RIGHT ELBOW: Status: ACTIVE | Noted: 2022-03-28

## 2024-08-07 PROBLEM — R14.0 ABDOMINAL DISTENSION: Status: ACTIVE | Noted: 2021-03-25

## 2024-08-07 PROBLEM — M54.2 NECK PAIN: Status: ACTIVE | Noted: 2021-01-29

## 2024-08-07 PROBLEM — R10.9 ABDOMINAL PAIN: Status: ACTIVE | Noted: 2021-01-27

## 2024-08-07 PROBLEM — G43.109 MIGRAINE WITH AURA AND WITHOUT STATUS MIGRAINOSUS, NOT INTRACTABLE: Status: ACTIVE | Noted: 2020-01-29

## 2024-08-07 PROBLEM — R59.1 LYMPHADENOPATHY: Status: ACTIVE | Noted: 2021-04-20

## 2024-08-07 PROBLEM — K58.0 IRRITABLE BOWEL SYNDROME WITH DIARRHEA: Status: ACTIVE | Noted: 2020-11-06

## 2024-08-07 PROBLEM — R42 DIZZINESS: Status: ACTIVE | Noted: 2021-09-16

## 2024-08-07 PROBLEM — Z98.891 HISTORY OF CESAREAN SECTION: Status: ACTIVE | Noted: 2018-09-24

## 2024-08-07 PROBLEM — F41.0 GENERALIZED ANXIETY DISORDER WITH PANIC ATTACKS: Status: ACTIVE | Noted: 2020-01-29

## 2024-08-07 PROBLEM — Z20.822 EXPOSURE TO COVID-19 VIRUS: Status: ACTIVE | Noted: 2020-05-11

## 2024-08-07 PROBLEM — N64.4 PAIN OF RIGHT BREAST: Status: ACTIVE | Noted: 2023-02-01

## 2024-08-07 PROBLEM — F32.A DEPRESSION: Status: ACTIVE | Noted: 2020-11-06

## 2024-08-07 PROBLEM — B00.1 RECURRENT COLD SORES: Status: ACTIVE | Noted: 2021-07-20

## 2024-08-07 PROBLEM — G47.9 SLEEP DISTURBANCE: Status: ACTIVE | Noted: 2022-03-28

## 2024-08-07 PROBLEM — N76.0 VAGINITIS: Status: ACTIVE | Noted: 2024-08-07

## 2024-08-07 PROBLEM — N63.10 MASS OF RIGHT BREAST: Status: ACTIVE | Noted: 2023-01-19

## 2024-08-07 PROCEDURE — 99204 OFFICE O/P NEW MOD 45 MIN: CPT | Performed by: STUDENT IN AN ORGANIZED HEALTH CARE EDUCATION/TRAINING PROGRAM

## 2024-08-07 RX ORDER — DULOXETIN HYDROCHLORIDE 30 MG/1
30 CAPSULE, DELAYED RELEASE ORAL DAILY
Qty: 90 CAPSULE | Refills: 1 | Status: SHIPPED | OUTPATIENT
Start: 2024-08-07

## 2024-08-07 SDOH — ECONOMIC STABILITY: INCOME INSECURITY: HOW HARD IS IT FOR YOU TO PAY FOR THE VERY BASICS LIKE FOOD, HOUSING, MEDICAL CARE, AND HEATING?: NOT VERY HARD

## 2024-08-07 SDOH — ECONOMIC STABILITY: HOUSING INSECURITY
IN THE LAST 12 MONTHS, WAS THERE A TIME WHEN YOU DID NOT HAVE A STEADY PLACE TO SLEEP OR SLEPT IN A SHELTER (INCLUDING NOW)?: NO

## 2024-08-07 SDOH — ECONOMIC STABILITY: FOOD INSECURITY: WITHIN THE PAST 12 MONTHS, THE FOOD YOU BOUGHT JUST DIDN'T LAST AND YOU DIDN'T HAVE MONEY TO GET MORE.: NEVER TRUE

## 2024-08-07 SDOH — ECONOMIC STABILITY: FOOD INSECURITY: WITHIN THE PAST 12 MONTHS, YOU WORRIED THAT YOUR FOOD WOULD RUN OUT BEFORE YOU GOT MONEY TO BUY MORE.: NEVER TRUE

## 2024-08-07 ASSESSMENT — PATIENT HEALTH QUESTIONNAIRE - PHQ9
SUM OF ALL RESPONSES TO PHQ QUESTIONS 1-9: 6
SUM OF ALL RESPONSES TO PHQ QUESTIONS 1-9: 6
2. FEELING DOWN, DEPRESSED OR HOPELESS: NOT AT ALL
7. TROUBLE CONCENTRATING ON THINGS, SUCH AS READING THE NEWSPAPER OR WATCHING TELEVISION: MORE THAN HALF THE DAYS
5. POOR APPETITE OR OVEREATING: NOT AT ALL
3. TROUBLE FALLING OR STAYING ASLEEP: MORE THAN HALF THE DAYS
8. MOVING OR SPEAKING SO SLOWLY THAT OTHER PEOPLE COULD HAVE NOTICED. OR THE OPPOSITE, BEING SO FIGETY OR RESTLESS THAT YOU HAVE BEEN MOVING AROUND A LOT MORE THAN USUAL: NOT AT ALL
SUM OF ALL RESPONSES TO PHQ9 QUESTIONS 1 & 2: 0
SUM OF ALL RESPONSES TO PHQ QUESTIONS 1-9: 6
9. THOUGHTS THAT YOU WOULD BE BETTER OFF DEAD, OR OF HURTING YOURSELF: NOT AT ALL
1. LITTLE INTEREST OR PLEASURE IN DOING THINGS: NOT AT ALL
6. FEELING BAD ABOUT YOURSELF - OR THAT YOU ARE A FAILURE OR HAVE LET YOURSELF OR YOUR FAMILY DOWN: SEVERAL DAYS
SUM OF ALL RESPONSES TO PHQ QUESTIONS 1-9: 6
4. FEELING TIRED OR HAVING LITTLE ENERGY: SEVERAL DAYS
10. IF YOU CHECKED OFF ANY PROBLEMS, HOW DIFFICULT HAVE THESE PROBLEMS MADE IT FOR YOU TO DO YOUR WORK, TAKE CARE OF THINGS AT HOME, OR GET ALONG WITH OTHER PEOPLE: NOT DIFFICULT AT ALL

## 2024-08-07 ASSESSMENT — ANXIETY QUESTIONNAIRES
IF YOU CHECKED OFF ANY PROBLEMS ON THIS QUESTIONNAIRE, HOW DIFFICULT HAVE THESE PROBLEMS MADE IT FOR YOU TO DO YOUR WORK, TAKE CARE OF THINGS AT HOME, OR GET ALONG WITH OTHER PEOPLE: NOT DIFFICULT AT ALL
5. BEING SO RESTLESS THAT IT IS HARD TO SIT STILL: MORE THAN HALF THE DAYS
7. FEELING AFRAID AS IF SOMETHING AWFUL MIGHT HAPPEN: NEARLY EVERY DAY
1. FEELING NERVOUS, ANXIOUS, OR ON EDGE: MORE THAN HALF THE DAYS
4. TROUBLE RELAXING: MORE THAN HALF THE DAYS
GAD7 TOTAL SCORE: 16
2. NOT BEING ABLE TO STOP OR CONTROL WORRYING: NEARLY EVERY DAY
3. WORRYING TOO MUCH ABOUT DIFFERENT THINGS: MORE THAN HALF THE DAYS
6. BECOMING EASILY ANNOYED OR IRRITABLE: MORE THAN HALF THE DAYS

## 2024-08-07 NOTE — PROGRESS NOTES
Chief Complaint   Patient presents with    Establish Care     Patient would like to get her Dipti tested because they were elevated last time.       1. \"Have you been to the ER, urgent care clinic since your last visit?  Hospitalized since your last visit?\" No    2. \"Have you seen or consulted any other health care providers outside of the [facility] since your last visit?\" No     3. For patients aged 45-75: Has the patient had a colonoscopy / FIT/ Cologuard? NA - based on age      If the patient is female:    4. For patients aged 40-74: Has the patient had a mammogram within the past 2 years? Yes - Care Gap present. Rooming MA/LPN to request most recent results      5. For patients aged 21-65: Has the patient had a pap smear? Yes - no Care Gap present

## 2024-08-07 NOTE — PROGRESS NOTES
Sarah Reyes (: 1985) is a 38 y.o. female, new patient, here for evaluation of the following chief complaint(s):  Establish Care (Patient would like to get her Chato tested because they were elevated last time.)        Assessment & Plan  1. Anxiety.  Symptoms and history indicate a diagnosis of anxiety, characterized by high stress levels, irritability, and difficulty relaxing. Duloxetine 30 mg has been prescribed to manage her symptoms, with the expectation of full therapeutic benefit in approximately 6 weeks. Potential side effects, including nausea and mental fog, were discussed, and she is advised to continue the medication even if initial side effects occur, as they are generally temporary. She is also encouraged to reach out if she experiences any adverse effects.    2. Joint Pain.  The patient reports persistent joint pain, particularly in the neck and elbows. Previous rheumatologic evaluations and lab results, including CHATO and RNP antibodies, were reviewed and found not to indicate a rheumatologic autoimmune disorder. She is advised to continue physical therapy for her neck pain, which has been beneficial. Over-the-counter pain relief such as ibuprofen can be used as needed.    3. Hair Loss.  Despite using spironolactone, oral minoxidil, and other treatments, she continues to experience significant hair loss. She is currently under the care of a dermatologist for this condition. No additional treatment changes are recommended at this time.    4. Enlarged Thyroid.  The patient has a history of an enlarged thyroid with normal thyroid function tests. She is under the care of an endocrinologist, who has confirmed that her thyroid levels are normal. No further intervention is required at this time.    Follow-up  The patient will follow up in 2 months.    Results    1. Generalized anxiety disorder  -     DULoxetine (CYMBALTA) 30 MG extended release capsule; Take 1 capsule by mouth daily, Disp-90
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
## 2024-09-06 ENCOUNTER — OFFICE VISIT (OUTPATIENT)
Age: 39
End: 2024-09-06
Payer: COMMERCIAL

## 2024-09-06 VITALS — HEIGHT: 60 IN | WEIGHT: 127 LBS | BODY MASS INDEX: 24.94 KG/M2 | TEMPERATURE: 98.7 F

## 2024-09-06 DIAGNOSIS — M54.50 LUMBAR PAIN: ICD-10-CM

## 2024-09-06 DIAGNOSIS — M54.2 NECK PAIN: ICD-10-CM

## 2024-09-06 DIAGNOSIS — D17.1 LIPOMA OF LUMBOSACRAL REGION: ICD-10-CM

## 2024-09-06 DIAGNOSIS — S16.1XXD STRAIN OF NECK MUSCLE, SUBSEQUENT ENCOUNTER: ICD-10-CM

## 2024-09-06 DIAGNOSIS — M50.30 DDD (DEGENERATIVE DISC DISEASE), CERVICAL: Primary | ICD-10-CM

## 2024-09-06 PROCEDURE — 99213 OFFICE O/P EST LOW 20 MIN: CPT | Performed by: PHYSICAL MEDICINE & REHABILITATION

## 2024-10-09 ENCOUNTER — OFFICE VISIT (OUTPATIENT)
Facility: CLINIC | Age: 39
End: 2024-10-09

## 2024-10-09 ENCOUNTER — HOSPITAL ENCOUNTER (OUTPATIENT)
Facility: HOSPITAL | Age: 39
Setting detail: SPECIMEN
Discharge: HOME | End: 2024-10-12
Payer: COMMERCIAL

## 2024-10-09 VITALS
TEMPERATURE: 97.1 F | WEIGHT: 125.8 LBS | HEIGHT: 60 IN | DIASTOLIC BLOOD PRESSURE: 73 MMHG | SYSTOLIC BLOOD PRESSURE: 111 MMHG | BODY MASS INDEX: 24.7 KG/M2 | HEART RATE: 75 BPM | OXYGEN SATURATION: 99 %

## 2024-10-09 DIAGNOSIS — Z23 ENCOUNTER FOR IMMUNIZATION: ICD-10-CM

## 2024-10-09 DIAGNOSIS — Z00.00 WELL WOMAN EXAM (NO GYNECOLOGICAL EXAM): Primary | ICD-10-CM

## 2024-10-09 DIAGNOSIS — B00.2 ORAL HERPES SIMPLEX INFECTION: ICD-10-CM

## 2024-10-09 DIAGNOSIS — M25.50 POLYARTHRALGIA: ICD-10-CM

## 2024-10-09 DIAGNOSIS — E01.0 THYROMEGALY: ICD-10-CM

## 2024-10-09 DIAGNOSIS — Z00.00 WELL WOMAN EXAM (NO GYNECOLOGICAL EXAM): ICD-10-CM

## 2024-10-09 LAB
ALBUMIN SERPL-MCNC: 4.7 G/DL (ref 3.4–5)
ALBUMIN/GLOB SERPL: 2 (ref 0.8–1.7)
ALP SERPL-CCNC: 42 U/L (ref 45–117)
ALT SERPL-CCNC: 24 U/L (ref 13–56)
ANION GAP SERPL CALC-SCNC: 5 MMOL/L (ref 3–18)
AST SERPL-CCNC: 16 U/L (ref 10–38)
BILIRUB SERPL-MCNC: 0.4 MG/DL (ref 0.2–1)
BUN SERPL-MCNC: 18 MG/DL (ref 7–18)
BUN/CREAT SERPL: 23 (ref 12–20)
CALCIUM SERPL-MCNC: 9.5 MG/DL (ref 8.5–10.1)
CHLORIDE SERPL-SCNC: 106 MMOL/L (ref 100–111)
CHOLEST SERPL-MCNC: 219 MG/DL
CO2 SERPL-SCNC: 28 MMOL/L (ref 21–32)
CREAT SERPL-MCNC: 0.78 MG/DL (ref 0.6–1.3)
CRP SERPL-MCNC: <0.3 MG/DL (ref 0–0.3)
ERYTHROCYTE [DISTWIDTH] IN BLOOD BY AUTOMATED COUNT: 11.1 % (ref 11.6–14.5)
ERYTHROCYTE [SEDIMENTATION RATE] IN BLOOD: 1 MM/HR (ref 0–30)
GLOBULIN SER CALC-MCNC: 2.4 G/DL (ref 2–4)
GLUCOSE SERPL-MCNC: 95 MG/DL (ref 74–99)
HCT VFR BLD AUTO: 42.7 % (ref 35–45)
HDLC SERPL-MCNC: 83 MG/DL (ref 40–60)
HDLC SERPL: 2.6 (ref 0–5)
HGB BLD-MCNC: 14.2 G/DL (ref 12–16)
LDLC SERPL CALC-MCNC: 125.6 MG/DL (ref 0–100)
LIPID PANEL: ABNORMAL
MCH RBC QN AUTO: 31.7 PG (ref 24–34)
MCHC RBC AUTO-ENTMCNC: 33.3 G/DL (ref 31–37)
MCV RBC AUTO: 95.3 FL (ref 78–100)
NRBC # BLD: 0 K/UL (ref 0–0.01)
NRBC BLD-RTO: 0 PER 100 WBC
PLATELET # BLD AUTO: 313 K/UL (ref 135–420)
PMV BLD AUTO: 10.9 FL (ref 9.2–11.8)
POTASSIUM SERPL-SCNC: 4.8 MMOL/L (ref 3.5–5.5)
PROT SERPL-MCNC: 7.1 G/DL (ref 6.4–8.2)
RBC # BLD AUTO: 4.48 M/UL (ref 4.2–5.3)
SODIUM SERPL-SCNC: 139 MMOL/L (ref 136–145)
T4 FREE SERPL-MCNC: 1.1 NG/DL (ref 0.7–1.5)
TRIGL SERPL-MCNC: 52 MG/DL
TSH SERPL DL<=0.05 MIU/L-ACNC: 1.68 UIU/ML (ref 0.36–3.74)
VLDLC SERPL CALC-MCNC: 10.4 MG/DL
WBC # BLD AUTO: 7.5 K/UL (ref 4.6–13.2)

## 2024-10-09 PROCEDURE — 84439 ASSAY OF FREE THYROXINE: CPT

## 2024-10-09 PROCEDURE — 85027 COMPLETE CBC AUTOMATED: CPT

## 2024-10-09 PROCEDURE — 84443 ASSAY THYROID STIM HORMONE: CPT

## 2024-10-09 PROCEDURE — 85652 RBC SED RATE AUTOMATED: CPT

## 2024-10-09 PROCEDURE — 80061 LIPID PANEL: CPT

## 2024-10-09 PROCEDURE — 80053 COMPREHEN METABOLIC PANEL: CPT

## 2024-10-09 PROCEDURE — 86140 C-REACTIVE PROTEIN: CPT

## 2024-10-09 RX ORDER — VALACYCLOVIR HYDROCHLORIDE 1 G/1
2000 TABLET, FILM COATED ORAL 2 TIMES DAILY
Qty: 28 TABLET | Refills: 1 | Status: SHIPPED | OUTPATIENT
Start: 2024-10-09

## 2024-10-09 ASSESSMENT — ANXIETY QUESTIONNAIRES
IF YOU CHECKED OFF ANY PROBLEMS ON THIS QUESTIONNAIRE, HOW DIFFICULT HAVE THESE PROBLEMS MADE IT FOR YOU TO DO YOUR WORK, TAKE CARE OF THINGS AT HOME, OR GET ALONG WITH OTHER PEOPLE: NOT DIFFICULT AT ALL
1. FEELING NERVOUS, ANXIOUS, OR ON EDGE: SEVERAL DAYS
3. WORRYING TOO MUCH ABOUT DIFFERENT THINGS: SEVERAL DAYS
6. BECOMING EASILY ANNOYED OR IRRITABLE: NOT AT ALL
2. NOT BEING ABLE TO STOP OR CONTROL WORRYING: NOT AT ALL
GAD7 TOTAL SCORE: 2
4. TROUBLE RELAXING: NOT AT ALL
5. BEING SO RESTLESS THAT IT IS HARD TO SIT STILL: NOT AT ALL
7. FEELING AFRAID AS IF SOMETHING AWFUL MIGHT HAPPEN: NOT AT ALL

## 2024-10-09 NOTE — PROGRESS NOTES
Sarah Reyes (: 1985) is a 39 y.o. female, established patient, here for evaluation of the following chief complaint(s):  Anxiety (Patient here for her 6 week follow up. She states the medication does seem to help some with her pain but she still has some neck pain. She states she guesses this has helped with her anxiety as well. )        Assessment & Plan  1.  Neck pain/polyarthralgia  The patient reports significant improvement in pain with duloxetine 30 mg, with no significant side effects. She is advised to continue with the current dosage. Rheumatologic screening labs, including ESR and CRP, will be ordered to rule out rheumatologic conditions. She is also advised to continue her home exercise program.  Potential side effect of the duloxetine-sexual dysfunction.  Overall some decreased libido.  Discussed trying to wean off spironolactone to see if this is contributing as it appears to have more side effects than benefit for her anyway since she is also on minoxidil.  Continue Cymbalta 30 mg for right now    2. Cold sores.  A prescription for valacyclovir 2 g twice a day for the first day at the earliest sign of a cold sore has been sent to her pharmacy ([facility] in [place]).    3. Nipple discharge.  The discharge is not bloody, crusting, or exhibiting other signs that would necessitate a cancer workup.  There is a potential that the spironolactone is contributing to this symptom.  Unless symptoms change, no further workup is needed.  Recommend screening mammogram as below     4. Anxiety.  The patient reports no current feelings of anxiety.  Currently on duloxetine 30 mg.      5.  Enlarged thyroid.  The patient has a history of enlarged thyroid.  Recommend yearly follow-up, getting labs today.      6. Health Maintenance.  Discussed age appropriate well woman screenings and preventative care.   The patient is advised to follow up on her mammogram due to a family history of breast cancer. A flu shot

## 2024-10-09 NOTE — PROGRESS NOTES
Chief Complaint   Patient presents with    Anxiety     Patient here for her 6 week follow up. She states the medication does seem to help some with her pain but she still has some neck pain. She states she guesses this has helped with her anxiety as well.              \"Have you been to the ER, urgent care clinic since your last visit?  Hospitalized since your last visit?\"    NO    “Have you seen or consulted any other health care providers outside our system since your last visit?”    NO

## 2024-11-18 ENCOUNTER — OFFICE VISIT (OUTPATIENT)
Facility: CLINIC | Age: 39
End: 2024-11-18
Payer: COMMERCIAL

## 2024-11-18 VITALS
OXYGEN SATURATION: 99 % | DIASTOLIC BLOOD PRESSURE: 60 MMHG | TEMPERATURE: 98.3 F | SYSTOLIC BLOOD PRESSURE: 102 MMHG | HEART RATE: 72 BPM | WEIGHT: 129.4 LBS | BODY MASS INDEX: 25.27 KG/M2

## 2024-11-18 DIAGNOSIS — J01.90 ACUTE BACTERIAL RHINOSINUSITIS: Primary | ICD-10-CM

## 2024-11-18 DIAGNOSIS — B96.89 ACUTE BACTERIAL RHINOSINUSITIS: Primary | ICD-10-CM

## 2024-11-18 PROCEDURE — 99213 OFFICE O/P EST LOW 20 MIN: CPT | Performed by: STUDENT IN AN ORGANIZED HEALTH CARE EDUCATION/TRAINING PROGRAM

## 2024-11-18 NOTE — PROGRESS NOTES
Sarah Reyes (: 1985) is a 39 y.o. female, established patient, here for evaluation of the following chief complaint(s):  Facial Pain (Patient c/o sinus pressure x 1 week. 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 She has been taking OTC treatment that has not helped. She says she also has pain in her teeth. )        Assessment & Plan  1. Sinus Infection - Reports pressure in teeth, behind eyes, and dizziness when bending over. Using nasal saline flushes, Sudafed, Tylenol Severe Congestion, and Mucinex without significant relief. Exam reveals congestion more pronounced on the right side.  - Advised to maintain adequate hydration to thin mucus  - Continue using saline rinses  - Prescribed Augmentin, to be taken twice daily for 7 days  - If symptoms persist beyond antibiotics, further evaluation may be necessary    Follow-up  - Follow-up in 2 months    Results    1. Acute bacterial rhinosinusitis  -     amoxicillin-clavulanate (AUGMENTIN) 875-125 MG per tablet; Take 1 tablet by mouth 2 times daily for 7 days, Disp-14 tablet, R-0Normal    Return if symptoms worsen or fail to improve.         Subjective   History of Present Illness  The patient presents for evaluation of a sinus infection.    Symptoms since last Monday or Tuesday include toothache, pressure behind eyes, ear pain, and dizziness when bending over. Despite using nasal saline flushes, Sudafed, Tylenol Severe Congestion, and Mucinex, her condition remains unchanged. She applies a warm washcloth to her nose and face for relief.    She admits to not drinking enough fluids due to her busy work schedule. She has not taken any antibiotics recently and has not used the steroid pack prescribed for neck inflammation due to concerns about swelling. She tested negative for COVID-19 twice and reports no cough.    ALLERGIES  No known

## 2024-11-18 NOTE — PROGRESS NOTES
Chief Complaint   Patient presents with    Facial Pain     Patient c/o sinus pressure x 1 week. 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 She has been taking OTC treatment that has not helped. She says she also has pain in her teeth.          \"Have you been to the ER, urgent care clinic since your last visit?  Hospitalized since your last visit?\"    NO    “Have you seen or consulted any other health care providers outside our system since your last visit?”    NO

## 2024-11-25 ENCOUNTER — HOSPITAL ENCOUNTER (OUTPATIENT)
Facility: HOSPITAL | Age: 39
Discharge: HOME | End: 2024-11-28
Attending: OBSTETRICS & GYNECOLOGY
Payer: COMMERCIAL

## 2024-11-25 VITALS — HEIGHT: 61 IN | BODY MASS INDEX: 25.49 KG/M2 | WEIGHT: 135 LBS

## 2024-11-25 DIAGNOSIS — Z12.31 ENCOUNTER FOR SCREENING MAMMOGRAM FOR MALIGNANT NEOPLASM OF BREAST: ICD-10-CM

## 2024-11-25 PROCEDURE — 77063 BREAST TOMOSYNTHESIS BI: CPT

## 2024-12-13 ENCOUNTER — OFFICE VISIT (OUTPATIENT)
Age: 39
End: 2024-12-13

## 2024-12-13 VITALS
HEART RATE: 76 BPM | WEIGHT: 127 LBS | BODY MASS INDEX: 23.98 KG/M2 | OXYGEN SATURATION: 99 % | HEIGHT: 61 IN | TEMPERATURE: 98 F

## 2024-12-13 DIAGNOSIS — M54.50 LUMBAR PAIN: ICD-10-CM

## 2024-12-13 DIAGNOSIS — M54.2 NECK PAIN: ICD-10-CM

## 2024-12-13 DIAGNOSIS — D17.1 LIPOMA OF LUMBOSACRAL REGION: ICD-10-CM

## 2024-12-13 DIAGNOSIS — M50.30 DDD (DEGENERATIVE DISC DISEASE), CERVICAL: Primary | ICD-10-CM

## 2024-12-13 DIAGNOSIS — S16.1XXD STRAIN OF NECK MUSCLE, SUBSEQUENT ENCOUNTER: ICD-10-CM

## 2024-12-13 NOTE — PROGRESS NOTES
Written by Dawna Anaya medical scribe, as dictated by Solomon Calzada MD  I examined the patient, reviewed and agree with the note.

## 2024-12-16 ENCOUNTER — TELEPHONE (OUTPATIENT)
Age: 39
End: 2024-12-16

## 2024-12-16 DIAGNOSIS — M54.2 NECK PAIN: Primary | ICD-10-CM

## 2024-12-16 DIAGNOSIS — S16.1XXD STRAIN OF NECK MUSCLE, SUBSEQUENT ENCOUNTER: ICD-10-CM

## 2024-12-16 DIAGNOSIS — M50.30 DDD (DEGENERATIVE DISC DISEASE), CERVICAL: ICD-10-CM

## 2024-12-16 NOTE — TELEPHONE ENCOUNTER
Pt just seen 12/13/24 for her neck with Dr. Calzada.  I would think it would be ok, but run it past him for the order.

## 2024-12-16 NOTE — TELEPHONE ENCOUNTER
Patient called to ask if it would be possible to get a new physical therapy order for her Neck pain. If possible, she would like it sent to [facility] in [place], the same place she went previously.    Please review and advise patient, [phone]

## 2024-12-17 ENCOUNTER — TELEPHONE (OUTPATIENT)
Age: 39
End: 2024-12-17

## 2024-12-17 NOTE — TELEPHONE ENCOUNTER
Pt called back and per the call center \"The pt is a little upset with her visit from Dr. Calzada on 12/13/2024.\" Call center was not able to give what exactly she was upset about.   The call was then transferred to me. Pts first words were \"Let me guess he isn't going to let me do Physical Therapy?\" I let the pt know \"No, he is. I was calling you back to let you know the PT order has been placed and you will just have to follow up in 10 weeks.\" Pt said her thanks and is aware the  will be calling to schedule that follow up. Pt never spoke to me about anything pertaining to her being upset with her visit.

## 2024-12-17 NOTE — TELEPHONE ENCOUNTER
Patient left a voicemail asking for a call back to discuss some concerns. I called her back and she stated that Dr Calzada's office note states that she has had pain for 11 years and that is not true. She said that this incorrect information could be from Dr Aden's office. She also stated that she did not feel like he was listening to her and did not exam her neck in the area she was having the exact pain. She wants to know if he will order an MRI of her neck due to the pain she is having.

## 2024-12-17 NOTE — TELEPHONE ENCOUNTER
Anjali from [facility] states that they do not have the physical therapy order and the patient is scheduled for her first appt tomorrow, 12/18/2024 @ 11am    Please refax order to [facility] fax# [phone]    Anjali from [facility]  tel [phone]

## 2024-12-17 NOTE — TELEPHONE ENCOUNTER
I tried calling the pt to discuss and there was no answer. I left a message on the machine with my name and the office phone number asking for a call back to discuss.

## 2024-12-17 NOTE — TELEPHONE ENCOUNTER
I have faxed over the referral as well as called and spoken with Anjali to let her know. She will be reaching back out to us if there are any further issues.

## 2024-12-19 NOTE — TELEPHONE ENCOUNTER
Patient states that the pain has been going on for approx 3 years and has been getting worse. She said she doesn't know what surgery she would need but would like to go forward with the MRI first. Order entered for signature.

## 2024-12-20 NOTE — TELEPHONE ENCOUNTER
Patient contacted and she states that when she went to therapy yesterday that the therapist stated that she didn't care for Dr Calzada either and that he needs to read her office notes from her other providers because when she laid her down she could feel that her vertebrae are unaligned and after she worked on her the pain eased tremendously. She stated that she is going to seek a second opion. I told her that it does sound like she is dissatisfied with him and she may need to get another opinion. She said that she just didn't feel like he cares and if it was his family member he would feel much different. She also stated that the therapist told her that Dr Calzada does not read outside records when patients bring them in and I did tell her that the therapist is out of line and she does not work with him and does not know his practice and that he does read outside records. I let her know that she would need to come in and sign a medical records release form to get her records to take to whomever she decides to go to. Dr Calzada is aware of this conversation.

## 2025-01-30 ENCOUNTER — PATIENT MESSAGE (OUTPATIENT)
Age: 40
End: 2025-01-30

## 2025-01-30 NOTE — TELEPHONE ENCOUNTER
Emily Arenas, RT  Technologist     Telephone Encounter     Addendum     Encounter Date: 12/17/2024       Patient contacted and she states that when she went to therapy yesterday that the therapist stated that she didn't care for Dr Calzada either and that he needs to read her office notes from her other providers because when she laid her down she could feel that her vertebrae are unaligned and after she worked on her the pain eased tremendously. She stated that she is going to seek a second opion. I told her that it does sound like she is dissatisfied with him and she may need to get another opinion. She said that she just didn't feel like he cares and if it was his family member he would feel much different. She also stated that the therapist told her that Dr Calzada does not read outside records when patients bring them in and I did tell her that the therapist is out of line and she does not work with him and does not know his practice and that he does read outside records. I let her know that she would need to come in and sign a medical records release form to get her records to take to whomever she decides to go to. Dr Calzada is aware of this conversation.

## 2025-02-05 ENCOUNTER — OFFICE VISIT (OUTPATIENT)
Facility: CLINIC | Age: 40
End: 2025-02-05
Payer: COMMERCIAL

## 2025-02-05 VITALS
BODY MASS INDEX: 24.39 KG/M2 | HEART RATE: 79 BPM | DIASTOLIC BLOOD PRESSURE: 60 MMHG | OXYGEN SATURATION: 99 % | SYSTOLIC BLOOD PRESSURE: 102 MMHG | WEIGHT: 129.2 LBS | TEMPERATURE: 97.1 F | HEIGHT: 61 IN

## 2025-02-05 DIAGNOSIS — N62 MACROMASTIA: ICD-10-CM

## 2025-02-05 DIAGNOSIS — M54.2 NECK PAIN: Primary | ICD-10-CM

## 2025-02-05 DIAGNOSIS — R92.8 ABNORMAL MAMMOGRAM: ICD-10-CM

## 2025-02-05 DIAGNOSIS — Z71.3 WEIGHT LOSS COUNSELING, ENCOUNTER FOR: ICD-10-CM

## 2025-02-05 PROCEDURE — 99214 OFFICE O/P EST MOD 30 MIN: CPT | Performed by: STUDENT IN AN ORGANIZED HEALTH CARE EDUCATION/TRAINING PROGRAM

## 2025-02-05 NOTE — PROGRESS NOTES
Chief Complaint   Patient presents with    Anxiety     Patient states she stopped taking the Cymbalta around [place] due to side effects she was having.           \"Have you been to the ER, urgent care clinic since your last visit?  Hospitalized since your last visit?\"    NO    “Have you seen or consulted any other health care providers outside our system since your last visit?”    NO

## 2025-02-05 NOTE — PROGRESS NOTES
Sarah Reyes (: 1985) is a 39 y.o. female, established patient, here for evaluation of the following chief complaint(s):  Anxiety (Patient states she stopped taking the Cymbalta around  due to side effects she was having.  )        Assessment & Plan  1. Cervicalgia: Symptoms likely muscular.  Discussed differential diagnosis, based on history, old imaging and exam it is most likely exacerbated by large breasts. Pain somewhat alleviated by physical therapy and dry needling but returns quickly.  - Continue physical therapy exercises at home.  - Consider consultation with a plastic surgeon for potential breast reduction surgery.  - Use ibuprofen, heating pads, and topical agents like Voltaren gel and Biofreeze as needed.  - MRI of the neck deferred for now as does not appear to have neuropathic cervical spine pain.    2. Anxiety: Not worsened since discontinuing Cymbalta. 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  - Stable, ok to continue off cymbalta    3. Weight management: Concerns about weight gain attributed to Cymbalta and possibly perimenopause.  - Focus on a diet rich in fiber and protein, mindful of caloric intake.  - Regular exercise, including treadmill use, encouraged.    4. Breast asymmetry: Upcoming ultrasound and breast scan scheduled for next Monday due to noted asymmetry.    Follow-up  - Follow up with results of the ultrasound and breast scan.    Results    1. Neck pain  2. Macromastia  3. Weight loss counseling, encounter for  4. Abnormal mammogram    Return in about 6 months (around 2025) for routine check up.         Subjective   History of Present Illness  The patient is a 39-year-old female presenting with neck pain, anxiety, weight management issues, and breast asymmetry.    She discontinued her medication regimen (cymbalta) due to persistent neck pain, attributed to her previous occupation involving heavy lifting and large breast size. Despite 18

## 2025-02-06 SDOH — ECONOMIC STABILITY: FOOD INSECURITY: WITHIN THE PAST 12 MONTHS, THE FOOD YOU BOUGHT JUST DIDN'T LAST AND YOU DIDN'T HAVE MONEY TO GET MORE.: NEVER TRUE

## 2025-02-06 SDOH — ECONOMIC STABILITY: FOOD INSECURITY: WITHIN THE PAST 12 MONTHS, YOU WORRIED THAT YOUR FOOD WOULD RUN OUT BEFORE YOU GOT MONEY TO BUY MORE.: NEVER TRUE

## 2025-02-06 ASSESSMENT — PATIENT HEALTH QUESTIONNAIRE - PHQ9
6. FEELING BAD ABOUT YOURSELF - OR THAT YOU ARE A FAILURE OR HAVE LET YOURSELF OR YOUR FAMILY DOWN: NOT AT ALL
2. FEELING DOWN, DEPRESSED OR HOPELESS: NOT AT ALL
10. IF YOU CHECKED OFF ANY PROBLEMS, HOW DIFFICULT HAVE THESE PROBLEMS MADE IT FOR YOU TO DO YOUR WORK, TAKE CARE OF THINGS AT HOME, OR GET ALONG WITH OTHER PEOPLE: NOT DIFFICULT AT ALL
5. POOR APPETITE OR OVEREATING: MORE THAN HALF THE DAYS
9. THOUGHTS THAT YOU WOULD BE BETTER OFF DEAD, OR OF HURTING YOURSELF: NOT AT ALL
4. FEELING TIRED OR HAVING LITTLE ENERGY: MORE THAN HALF THE DAYS
SUM OF ALL RESPONSES TO PHQ QUESTIONS 1-9: 8
1. LITTLE INTEREST OR PLEASURE IN DOING THINGS: NOT AT ALL
SUM OF ALL RESPONSES TO PHQ QUESTIONS 1-9: 8
SUM OF ALL RESPONSES TO PHQ QUESTIONS 1-9: 8
8. MOVING OR SPEAKING SO SLOWLY THAT OTHER PEOPLE COULD HAVE NOTICED. OR THE OPPOSITE, BEING SO FIGETY OR RESTLESS THAT YOU HAVE BEEN MOVING AROUND A LOT MORE THAN USUAL: NOT AT ALL
7. TROUBLE CONCENTRATING ON THINGS, SUCH AS READING THE NEWSPAPER OR WATCHING TELEVISION: SEVERAL DAYS
3. TROUBLE FALLING OR STAYING ASLEEP: NEARLY EVERY DAY
SUM OF ALL RESPONSES TO PHQ9 QUESTIONS 1 & 2: 0
SUM OF ALL RESPONSES TO PHQ QUESTIONS 1-9: 8

## 2025-02-18 ENCOUNTER — HOSPITAL ENCOUNTER (OUTPATIENT)
Facility: HOSPITAL | Age: 40
Discharge: HOME | End: 2025-02-21
Attending: OBSTETRICS & GYNECOLOGY
Payer: COMMERCIAL

## 2025-02-18 DIAGNOSIS — R92.8 ABNORMAL MAMMOGRAM: ICD-10-CM

## 2025-02-18 PROCEDURE — G0279 TOMOSYNTHESIS, MAMMO: HCPCS

## 2025-03-17 ENCOUNTER — OFFICE VISIT (OUTPATIENT)
Facility: CLINIC | Age: 40
End: 2025-03-17
Payer: COMMERCIAL

## 2025-03-17 VITALS
HEART RATE: 81 BPM | BODY MASS INDEX: 24.07 KG/M2 | OXYGEN SATURATION: 99 % | WEIGHT: 127.38 LBS | TEMPERATURE: 98.2 F | DIASTOLIC BLOOD PRESSURE: 60 MMHG | RESPIRATION RATE: 16 BRPM | SYSTOLIC BLOOD PRESSURE: 100 MMHG

## 2025-03-17 DIAGNOSIS — M54.2 NECK PAIN: Primary | ICD-10-CM

## 2025-03-17 DIAGNOSIS — L65.9 ALOPECIA: ICD-10-CM

## 2025-03-17 DIAGNOSIS — L65.8 FEMALE PATTERN HAIR LOSS: ICD-10-CM

## 2025-03-17 PROCEDURE — 99214 OFFICE O/P EST MOD 30 MIN: CPT | Performed by: STUDENT IN AN ORGANIZED HEALTH CARE EDUCATION/TRAINING PROGRAM

## 2025-03-17 NOTE — PATIENT INSTRUCTIONS
To schedule appointments for CT, MRI, Ultrasound, Vascular Ultrasound, Mammography, Bone Density, and X-ray, please call Central Scheduling at [phone].

## 2025-03-17 NOTE — PROGRESS NOTES
Sarah Reyes (: 1985) is a 39 y.o. female, established patient, here for evaluation of the following chief complaint(s):  Neck Pain (Patient would like to talk about some lab results she received from rheumatology that are 2 years old.)        Assessment & Plan  1. Cervicalgia: Worsening. Inflammatory markers, including CRP and ESR, were normal in 2024. An autoimmune condition localized to the cervical region is unlikely.  Given the now prolonged chronicity and failure of conservative management with PT and oral medications, will obtain MRI of the cervical spine to rule out spinal cord or nerve impingement.  - Continue duloxetine.  - Use the minimum effective dose of ibuprofen.  - Consider Prilosec or Nexium for heartburn given frequent NSAID use.  - Try regular Tylenol in the morning and Tylenol PM at night if needed.    2. Hair thinning.  - Reduced spironolactone to 25 mg and continues minoxidil.  - Discussed that the minoxidil is likely doing more than spironolactone and gently with less side effects.  Discontinuing spironolactone is acceptable.    Follow-up  - Schedule MRI of the cervical spine.  - Follow up after MRI is completed.    Results    1. Neck pain  -     MRI CERVICAL SPINE WO CONTRAST; Future  2. Female pattern hair loss  3. Alopecia    Return for Following cervical MRI.         Subjective   History of Present Illness  The patient presents for evaluation of neck pain and hair thinning.    She resumed duloxetine, which helps manage her neck pain. Despite this, she experiences persistent neck discomfort upon awakening, described as deep-seated with crunching and popping sensations. The pain is constant, and during severe episodes, she feels a heavy, foggy sensation in her head, requiring isolation and rest. She is concerned about the seriousness of her condition and seeks reassurance. The pain has progressively worsened over several years.    Rheumatology found no significant

## 2025-04-03 ENCOUNTER — HOSPITAL ENCOUNTER (OUTPATIENT)
Facility: HOSPITAL | Age: 40
Discharge: HOME | End: 2025-04-03
Attending: STUDENT IN AN ORGANIZED HEALTH CARE EDUCATION/TRAINING PROGRAM
Payer: COMMERCIAL

## 2025-04-03 DIAGNOSIS — M54.2 NECK PAIN: ICD-10-CM

## 2025-04-03 PROCEDURE — 72141 MRI NECK SPINE W/O DYE: CPT

## 2025-04-05 ENCOUNTER — RESULTS FOLLOW-UP (OUTPATIENT)
Facility: CLINIC | Age: 40
End: 2025-04-05

## 2025-04-08 ENCOUNTER — OFFICE VISIT (OUTPATIENT)
Facility: CLINIC | Age: 40
End: 2025-04-08
Payer: COMMERCIAL

## 2025-04-08 VITALS
DIASTOLIC BLOOD PRESSURE: 74 MMHG | SYSTOLIC BLOOD PRESSURE: 100 MMHG | WEIGHT: 125.25 LBS | OXYGEN SATURATION: 98 % | BODY MASS INDEX: 23.67 KG/M2 | TEMPERATURE: 98.4 F | HEART RATE: 83 BPM | RESPIRATION RATE: 16 BRPM

## 2025-04-08 DIAGNOSIS — M62.830 SPASM OF RIGHT TRAPEZIUS MUSCLE: ICD-10-CM

## 2025-04-08 DIAGNOSIS — N62 MACROMASTIA: ICD-10-CM

## 2025-04-08 DIAGNOSIS — M54.2 NECK PAIN: Primary | ICD-10-CM

## 2025-04-08 PROCEDURE — 99214 OFFICE O/P EST MOD 30 MIN: CPT | Performed by: STUDENT IN AN ORGANIZED HEALTH CARE EDUCATION/TRAINING PROGRAM

## 2025-04-08 NOTE — PROGRESS NOTES
\"Have you been to the ER, urgent care clinic since your last visit?  Hospitalized since your last visit?\"    NO    “Have you seen or consulted any other health care providers outside our system since your last visit?”    NO             
tachycardia syndrome; Sleep disturbance; Recurrent cold sores; Thyromegaly; Right elbow pain; Macromastia; and Abnormal mammogram on their problem list.       Current Outpatient Medications   Medication Instructions    DULoxetine (CYMBALTA) 30 mg, Oral, DAILY    minoxidil (LONITEN) 2.5 mg, DAILY    Multiple Vitamin (MULTIVITAMIN PO) Take by mouth    spironolactone (ALDACTONE) 100 MG tablet     valACYclovir (VALTREX) 2,000 mg, Oral, 2 TIMES DAILY             The patient (or guardian, if applicable) and other individuals in attendance with the patient were advised that Artificial Intelligence will be utilized during this visit to record and process the conversation to generate a clinical note. The patient (or guardian, if applicable) and other individuals in attendance at the appointment consented to the use of AI, including the recording.      An electronic signature was used to authenticate this note.    --Prabhu Balderas MD

## 2025-05-14 DIAGNOSIS — B00.2 ORAL HERPES SIMPLEX INFECTION: ICD-10-CM

## 2025-05-17 RX ORDER — VALACYCLOVIR HYDROCHLORIDE 1 G/1
TABLET, FILM COATED ORAL
Qty: 28 TABLET | Refills: 3 | Status: SHIPPED | OUTPATIENT
Start: 2025-05-17

## 2025-06-06 ENCOUNTER — PATIENT MESSAGE (OUTPATIENT)
Facility: CLINIC | Age: 40
End: 2025-06-06

## 2025-06-06 DIAGNOSIS — N62 MACROMASTIA: Primary | ICD-10-CM

## 2025-08-13 ENCOUNTER — OFFICE VISIT (OUTPATIENT)
Facility: CLINIC | Age: 40
End: 2025-08-13
Payer: MEDICAID

## 2025-08-13 VITALS
OXYGEN SATURATION: 99 % | TEMPERATURE: 97.9 F | RESPIRATION RATE: 16 BRPM | WEIGHT: 124 LBS | SYSTOLIC BLOOD PRESSURE: 112 MMHG | DIASTOLIC BLOOD PRESSURE: 68 MMHG | HEIGHT: 61 IN | HEART RATE: 80 BPM | BODY MASS INDEX: 23.41 KG/M2

## 2025-08-13 DIAGNOSIS — F51.04 PSYCHOPHYSIOLOGICAL INSOMNIA: ICD-10-CM

## 2025-08-13 DIAGNOSIS — F41.1 GENERALIZED ANXIETY DISORDER: Primary | ICD-10-CM

## 2025-08-13 PROCEDURE — 99214 OFFICE O/P EST MOD 30 MIN: CPT | Performed by: STUDENT IN AN ORGANIZED HEALTH CARE EDUCATION/TRAINING PROGRAM

## 2025-08-13 RX ORDER — ESCITALOPRAM OXALATE 5 MG/1
5 TABLET ORAL DAILY
Qty: 30 TABLET | Refills: 0 | Status: SHIPPED | OUTPATIENT
Start: 2025-08-13

## 2025-08-13 RX ORDER — ESCITALOPRAM OXALATE 10 MG/1
10 TABLET ORAL DAILY
Qty: 90 TABLET | Refills: 1 | Status: SHIPPED | OUTPATIENT
Start: 2025-08-13

## 2025-08-13 RX ORDER — TRAZODONE HYDROCHLORIDE 50 MG/1
25 TABLET ORAL NIGHTLY PRN
Qty: 30 TABLET | Refills: 1 | Status: SHIPPED | OUTPATIENT
Start: 2025-08-13

## 2025-08-13 ASSESSMENT — PATIENT HEALTH QUESTIONNAIRE - PHQ9
3. TROUBLE FALLING OR STAYING ASLEEP: NEARLY EVERY DAY
2. FEELING DOWN, DEPRESSED OR HOPELESS: SEVERAL DAYS
6. FEELING BAD ABOUT YOURSELF - OR THAT YOU ARE A FAILURE OR HAVE LET YOURSELF OR YOUR FAMILY DOWN: SEVERAL DAYS
7. TROUBLE CONCENTRATING ON THINGS, SUCH AS READING THE NEWSPAPER OR WATCHING TELEVISION: SEVERAL DAYS
5. POOR APPETITE OR OVEREATING: MORE THAN HALF THE DAYS
SUM OF ALL RESPONSES TO PHQ QUESTIONS 1-9: 11
8. MOVING OR SPEAKING SO SLOWLY THAT OTHER PEOPLE COULD HAVE NOTICED. OR THE OPPOSITE, BEING SO FIGETY OR RESTLESS THAT YOU HAVE BEEN MOVING AROUND A LOT MORE THAN USUAL: SEVERAL DAYS
10. IF YOU CHECKED OFF ANY PROBLEMS, HOW DIFFICULT HAVE THESE PROBLEMS MADE IT FOR YOU TO DO YOUR WORK, TAKE CARE OF THINGS AT HOME, OR GET ALONG WITH OTHER PEOPLE: SOMEWHAT DIFFICULT
SUM OF ALL RESPONSES TO PHQ QUESTIONS 1-9: 11
4. FEELING TIRED OR HAVING LITTLE ENERGY: SEVERAL DAYS
1. LITTLE INTEREST OR PLEASURE IN DOING THINGS: SEVERAL DAYS
SUM OF ALL RESPONSES TO PHQ QUESTIONS 1-9: 11
SUM OF ALL RESPONSES TO PHQ QUESTIONS 1-9: 11
9. THOUGHTS THAT YOU WOULD BE BETTER OFF DEAD, OR OF HURTING YOURSELF: NOT AT ALL